# Patient Record
Sex: MALE | URBAN - METROPOLITAN AREA
[De-identification: names, ages, dates, MRNs, and addresses within clinical notes are randomized per-mention and may not be internally consistent; named-entity substitution may affect disease eponyms.]

---

## 2020-09-11 ENCOUNTER — HOSPITAL ENCOUNTER (OUTPATIENT)
Age: 40
Setting detail: SPECIMEN
Discharge: HOME | End: 2020-09-11
Payer: MEDICARE

## 2020-09-18 LAB — SURGICAL PATHOLOGY REPORT: NORMAL

## 2021-07-20 ENCOUNTER — OFFICE VISIT (OUTPATIENT)
Dept: INFECTIOUS DISEASES | Age: 41
End: 2021-07-20
Payer: MEDICARE

## 2021-07-20 ENCOUNTER — HOSPITAL ENCOUNTER (OUTPATIENT)
Age: 41
Discharge: HOME | End: 2021-07-20
Payer: MEDICARE

## 2021-07-20 VITALS
WEIGHT: 152 LBS | BODY MASS INDEX: 22.51 KG/M2 | SYSTOLIC BLOOD PRESSURE: 123 MMHG | HEIGHT: 69 IN | HEART RATE: 74 BPM | DIASTOLIC BLOOD PRESSURE: 75 MMHG | TEMPERATURE: 98.3 F

## 2021-07-20 DIAGNOSIS — R19.7 DIARRHEA OF PRESUMED INFECTIOUS ORIGIN: Primary | ICD-10-CM

## 2021-07-20 DIAGNOSIS — R19.7 DIARRHEA OF PRESUMED INFECTIOUS ORIGIN: ICD-10-CM

## 2021-07-20 PROCEDURE — 99203 OFFICE O/P NEW LOW 30 MIN: CPT | Performed by: INTERNAL MEDICINE

## 2021-07-20 PROCEDURE — 87506 IADNA-DNA/RNA PROBE TQ 6-11: CPT

## 2021-07-20 RX ORDER — CETIRIZINE HYDROCHLORIDE 10 MG/1
TABLET ORAL
COMMUNITY
Start: 2021-04-19

## 2021-07-20 NOTE — PROGRESS NOTES
[facility] - Initial Office Consult Note  Today's Date and Time: 7/20/2021, 4:01 PM    Diagnostic Impression :     1. Diarrhea of presumed infectious origin        Recommendations   · Stool tests for enteric pathogens, C difficile, Giardia, Cryptosporidium  · If no infectious etiology patient will need follow up evaluation with GI for possible endoscopy. Chief complaint/reason for consultation:     Chief Complaint   Patient presents with    Post-COVID Symptoms     diarrhea, dry throat, hair loss, muscle mass loss       History of Present Illness:   Mouna Jenkins is a 41y.o.-year-old male who was initially evaluated on 7/20/2021. Patient seen at the request of Dr. Manuela Nguyen. INITIAL HISTORY:    Patient was previously being treated at [facility] for symptoms which started in March 2020 consisting of diarrhea, muscle weakness, swelling of his tongue, and hair loss. He states that he developed these symptoms a week after sharing an [employer] ride with a passenger who was coughing in the backseat. He states that due to not having all of the COVID-19 symptoms, he was unable to receive a test at that time. He was subsequently seen by GI and received an EGD x2 in September 2020. During the first EGD he states they found a stomach ulcer, and the second EGD was unremarkable. It would seem that H pylori was found because he received several weeks of multiple antibiotics plus antacids and also had breath tests, presumably to check for H pylori. A GI panel on 9/10/2020, showed no bacterial, viral , parasitic enteric pathogens. He had a few additional tests and he was placed on Imodium which he has been taking every day since August 2020. A colonoscopy was not done. Patient also reports swelling of his tongue which started around the same time the other symptoms did. He states he has seen an ENT for this symptom, but was unresolved as of current visit.     He indicates the abdominal pain associated with the ulcer subsided but he has had continuous diarrhea up to the present. He also reports fatigue, but acknowledges a very busy schedule at work, including weekends. Difficulty falling asleep or staying sleep. He is a single father with two children who live with him. The patient was born in [place]. He is the manager of a [employer]. He has traveled to [facility]. CURRENT EVALUATION 7/20/2021:    Physical examination is madalyn. There is no appreciable swelling of the jennifer but he has some fissuring on the edges. Lab work:    Component 09/10/2020 09/10/2020 09/10/2020 09/10/2020          Specimen Source STOOL -- -- --   Campylobacter Not Detected -- -- --   Plesiomonas  Not Detected -- -- --   Salmonella Not Detected -- -- --   Vibrio Not Detected -- -- --   Vibrio Cholerae Not Detected -- -- --   Y Enterocolitica Not Detected -- -- --   Aggregative E Coli Not Detected -- -- --   Pathogenic E Coli Not Detected -- -- --   Toxigenic E Coli Not Detected -- -- --   Shiga Toxin E Coli Not Detected -- -- --   Shigella-E Coli Not Detected -- -- --   Cryptosporidium Not Detected -- -- --   Cyclospora Not Detected -- -- --   E Histolytica Not Detected -- -- --   Giardia Lamblia Not Detected -- -- --   Adenovirus Not Detected -- -- --   Astrovirus Not Detected -- -- --   Norovirus Not Detected -- -- --   Rotavirus A Not Detected -- -- --   Sapovirus Not Detected -- -- --   Pancreat elastase st -- See Below -- --   Toxigenic c diff -- -- Negative --   027 nap1 -- -- Presumptive Negative --   Calprotectin -- -- --        DISCUSSION:  · Patient with chronic diarrhea with prior negative workup for an infectious etiology  · Hx of peptic ulcer, likely with associated H pylori. · Extensive exposure to prior antibiotics. · Will plan to repeat tests for possible infection. · He will most likely need repeat GI evaluation including colonoscopy.     PLAN:    · Stool tests for enteric pathogens, tender. Bowel sounds normal. No organomegaly  All four Extremities: No cyanosis, clubbing, edema, or effusions. Neurologic: No gross sensory or motor deficits. Skin: Warm and dry with good turgor. No signs of peripheral arterial or venous insufficiency. Medical Decision Making:     Note:   · Large amounts of data were reviewed  · Discussed with nursing Staff, Discharge planner  · Infection Control and Prevention measures reviewed  · All prior entries were reviewed  · Administer medications as ordered  · Prognosis: Good  · Discharge planning reviewed  · Follow up as outpatient. Thank you for allowing us to participate in the care of this patient. Please call with questions. Blease Goldberg, DPM     ATTESTATION:    I have discussed the case, including pertinent history and exam findings with the residents and students. I have seen and examined the patient and the key elements of the encounter have been performed by me. I was present when the student obtained his information or examined the patient. I have reviewed the laboratory data, other diagnostic studies and discussed them with the residents. I have updated the medical record where necessary. I agree with the assessment, plan and orders as documented by the resident/ student.     Dario Bautista MD.    Pager: [phone] - Office: [phone]

## 2021-07-21 ENCOUNTER — HOSPITAL ENCOUNTER (OUTPATIENT)
Age: 41
Setting detail: SPECIMEN
Discharge: HOME | End: 2021-07-21
Payer: MEDICARE

## 2021-07-21 DIAGNOSIS — R19.7 DIARRHEA OF PRESUMED INFECTIOUS ORIGIN: ICD-10-CM

## 2021-07-21 PROCEDURE — 87329 GIARDIA AG IA: CPT

## 2021-07-21 PROCEDURE — 87506 IADNA-DNA/RNA PROBE TQ 6-11: CPT

## 2021-07-21 PROCEDURE — 87328 CRYPTOSPORIDIUM AG IA: CPT

## 2021-07-22 LAB
CAMPYLOBACTER PCR: NORMAL
DIRECT EXAM: NORMAL
DIRECT EXAM: NORMAL
E COLI ENTEROTOXIGENIC PCR: NORMAL
Lab: NORMAL
PLESIOMONAS SHIGELLOIDES PCR: NORMAL
SALMONELLA PCR: NORMAL
SHIGATOXIN GENE PCR: NORMAL
SHIGELLA SP PCR: NORMAL
SPECIMEN DESCRIPTION: NORMAL
SPECIMEN DESCRIPTION: NORMAL
VIBRIO PCR: NORMAL
YERSINIA ENTEROCOLITICA PCR: NORMAL

## 2021-08-24 ENCOUNTER — TELEPHONE (OUTPATIENT)
Dept: GASTROENTEROLOGY | Age: 41
End: 2021-08-24

## 2021-08-24 ENCOUNTER — OFFICE VISIT (OUTPATIENT)
Dept: INFECTIOUS DISEASES | Age: 41
End: 2021-08-24
Payer: MEDICARE

## 2021-08-24 VITALS
RESPIRATION RATE: 18 BRPM | HEART RATE: 78 BPM | TEMPERATURE: 98.2 F | BODY MASS INDEX: 24.55 KG/M2 | OXYGEN SATURATION: 97 % | HEIGHT: 67 IN | SYSTOLIC BLOOD PRESSURE: 120 MMHG | WEIGHT: 156.4 LBS | DIASTOLIC BLOOD PRESSURE: 69 MMHG

## 2021-08-24 DIAGNOSIS — R19.7 DIARRHEA OF PRESUMED INFECTIOUS ORIGIN: Primary | ICD-10-CM

## 2021-08-24 PROCEDURE — 99214 OFFICE O/P EST MOD 30 MIN: CPT | Performed by: INTERNAL MEDICINE

## 2021-08-24 NOTE — PATIENT INSTRUCTIONS
Office left voicemail requesting BERNARDINO - Dr Magda Moncada office to call you to schedule an appointment. If they do not call you by Wednesday afternoon, please call them.    Phone number can be found on referral.

## 2021-08-24 NOTE — PROGRESS NOTES
[facility] - Initial Office Consult Note  Today's Date and Time: 8/24/2021, 3:49 PM    Diagnostic Impression :     1. Diarrhea of presumed infectious origin        Recommendations   · Stool tests for enteric pathogens, C difficile, Giardia, Cryptosporidium-All negative  · Patient will need follow up evaluation with GI for possible endoscopy. Chief complaint/reason for consultation:     Chief Complaint   Patient presents with    Diarrhea     been having diarrhea 2 twice a day for over a year        History of Present Illness:   Girish Tripathi is a 41y.o.-year-old male who was initially evaluated on 8/24/2021. Patient seen at the request of Dr. Jian Porter. INITIAL HISTORY:    Patient was previously being treated at [address] [place] [address] for symptoms which started in March 2020 consisting of diarrhea, muscle weakness, swelling of his tongue, and hair loss. He states that he developed these symptoms a week after sharing an [employer] ride with a passenger who was coughing in the backseat. He states that due to not having all of the COVID-19 symptoms, he was unable to receive a test at that time. He was subsequently seen by GI and received an EGD x2 in September 2020. During the first EGD he states they found a stomach ulcer, and the second EGD was unremarkable. It would seem that H pylori was found because he received several weeks of multiple antibiotics plus antacids and also had breath tests, presumably to check for H pylori. A GI panel on 9/10/2020, showed no bacterial, viral , parasitic enteric pathogens. He had a few additional tests and he was placed on Imodium which he has been taking every day since August 2020. A colonoscopy was not done. Patient also reports swelling of his tongue which started around the same time the other symptoms did. He states he has seen an ENT for this symptom, but was unresolved as of current visit.     He indicates the abdominal pain associated with the ulcer 2021 10:14  [facility] De La [facility]ega   Giardia Antigen Assay Negative    Direct Exam 2021 10:14  César De La Vega   Cryptosporidium Antigen Assay Negative          DISCUSSION:  · Patient with chronic diarrhea with prior negative workup for an infectious etiology  · Hx of peptic ulcer, likely with associated H pylori. · Extensive exposure to prior antibiotics. · Repeat tests for possible infection have all been negative  · He was referred for a GI evaluation, including colonoscopy. PLAN:    · As the patient's stool tests have all been negative, and his symptoms continue, he was referred to Dr. Rodrigo Galvan with [facility]    I have personally reviewed the past medical history, past surgical history, medications, social history, and family history, and I have updated the database accordingly. Past Medical History:   History reviewed. No pertinent past medical history. Past Surgical  History:   History reviewed. No pertinent surgical history.     Medications:     Current Outpatient Medications:     cetirizine (ZYRTEC) 10 MG tablet, TAKE 1 TABLET BY MOUTH EVERY DAY (Patient not taking: Reported on 2021), Disp: , Rfl:      Social History:     Social History     Socioeconomic History    Marital status: Unknown     Spouse name: Not on file    Number of children: Not on file    Years of education: Not on file    Highest education level: Not on file   Occupational History    Not on file   Tobacco Use    Smoking status: Former Smoker     Packs/day: 1.00     Types: Cigarettes     Start date:      Quit date:      Years since quittin.6    Smokeless tobacco: Never Used   Vaping Use    Vaping Use: Never used   Substance and Sexual Activity    Alcohol use: Not Currently     Comment: Not currently drinking    Drug use: Never    Sexual activity: Never   Other Topics Concern    Not on file   Social History Narrative    Not on file     Social Determinants of Health     Financial Resource Strain:     Difficulty of Paying Living Expenses:    Food Insecurity:     Worried About [facility] in the Last Year:     [address] in the Last Year:    Transportation Needs:     Lack of Transportation (Medical):  Lack of Transportation (Non-Medical):    Physical Activity:     Days of Exercise per Week:     Minutes of Exercise per Session:    Stress:     Feeling of Stress :    Social Connections:     Frequency of Communication with Friends and Family:     Frequency of Social Gatherings with Friends and Family:     Attends Mandaeism Services:     Active Member of Clubs or Organizations:     Attends Club or Organization Meetings:     Marital Status:    Intimate Partner Violence:     Fear of Current or Ex-Partner:     Emotionally Abused:     Physically Abused:     Sexually Abused:        Family History:   History reviewed. No pertinent family history. Allergies:   Patient has no known allergies. Review of Systems:   Constitutional: No fevers or chills. No systemic complaints  Head: No headaches  Eyes: No double vision or blurry vision. ENT: Reports tinnitus. Cardiovascular: Reports occasional chest pain. No shortness of breath. No TORRES  Lung: No shortness of breath or cough. No sputum production  Abdomen: Reports diarrhea. No nausea, vomiting, or abdominal pain. Genitourinary: No increased urinary frequency, or dysuria. No hematuria. No suprapubic or CVA pain  Musculoskeletal: No muscle aches or pains. No joint effusions, swelling or deformities  Hematologic: No bleeding or bruising. Neurologic: No headache, weakness, numbness, or tingling.     Physical Examination :   /69 (Site: Right Upper Arm, Position: Sitting, Cuff Size: Medium Adult)   Pulse 78   Temp 98.2 °F (36.8 °C) (Temporal)   Resp 18   Ht 5' 7\" (1.702 m)   Wt 156 lb 6.4 oz (70.9 kg)   SpO2 97% Comment: room air at rest  BMI 24.50 kg/m²    General Appearance: Awake, alert, and in no apparent distress  Head:  Normocephalic, no trauma  Eyes: Pupils equal, round, reactive, to light and accommodation; extraocular movements intact; sclera anicteric; conjunctivae pink. No embolic phenomena. ENT: Oropharynx clear, without erythema, exudate, or thrush. No tenderness of sinuses. Mouth/throat: mucosa pink and moist. No lesions. Dentition in good repair. Neck:Supple, without lymphadenopathy. Thyroid normal, No bruits. Pulmonary/Chest: Clear to auscultation, without wheezes, rales, or rhonchi. No dullness to percussion. Cardiovascular: Regular rate and rhythm without murmurs, rubs, or gallops. Abdomen: Soft, non tender. Bowel sounds normal. No organomegaly  All four Extremities: No cyanosis, clubbing, edema, or effusions. Neurologic: No gross sensory or motor deficits. Skin: Warm and dry with good turgor. No signs of peripheral arterial or venous insufficiency. Medical Decision Making:     Note:   · Large amounts of data were reviewed  · Discussed with nursing Staff, Discharge planner  · Infection Control and Prevention measures reviewed  · All prior entries were reviewed  · Administer medications as ordered  · Prognosis: Good  · Discharge planning reviewed  · Follow up as outpatient. Thank you for allowing us to participate in the care of this patient. Please call with questions. WU Butt - CNP     ATTESTATION:    I have discussed the case, including pertinent history and exam findings with the APRN. I have evaluated the  History, physical findings and pictures of the patient and the key elements of the encounter have been performed by me. I have reviewed the laboratory data, other diagnostic studies and discussed them with the APRN. I have updated the medical record where necessary. I agree with the assessment, plan and orders as documented by the APRN.     Valentino Calvin MD.      Pager: [phone] - Office: [phone]

## 2021-10-27 ENCOUNTER — HOSPITAL ENCOUNTER (OUTPATIENT)
Age: 41
Discharge: HOME | End: 2021-10-29
Payer: MEDICARE

## 2021-10-27 ENCOUNTER — HOSPITAL ENCOUNTER (OUTPATIENT)
Dept: GENERAL RADIOLOGY | Age: 41
Discharge: HOME | End: 2021-10-29
Payer: MEDICARE

## 2021-10-27 DIAGNOSIS — R07.9 CHEST PAIN, UNSPECIFIED TYPE: ICD-10-CM

## 2021-10-27 PROCEDURE — 71046 X-RAY EXAM CHEST 2 VIEWS: CPT

## 2021-11-10 ENCOUNTER — OFFICE VISIT (OUTPATIENT)
Dept: GASTROENTEROLOGY | Age: 41
End: 2021-11-10
Payer: MEDICARE

## 2021-11-10 ENCOUNTER — HOSPITAL ENCOUNTER (OUTPATIENT)
Age: 41
Discharge: HOME | End: 2021-11-10
Payer: MEDICARE

## 2021-11-10 VITALS — WEIGHT: 157 LBS | DIASTOLIC BLOOD PRESSURE: 79 MMHG | BODY MASS INDEX: 24.59 KG/M2 | SYSTOLIC BLOOD PRESSURE: 133 MMHG

## 2021-11-10 DIAGNOSIS — R19.5 LOOSE BOWEL MOVEMENT: ICD-10-CM

## 2021-11-10 DIAGNOSIS — R19.5 LOOSE BOWEL MOVEMENT: Primary | ICD-10-CM

## 2021-11-10 LAB — TOTAL CK: 84 U/L (ref 39–308)

## 2021-11-10 PROCEDURE — 82550 ASSAY OF CK (CPK): CPT

## 2021-11-10 PROCEDURE — 85652 RBC SED RATE AUTOMATED: CPT

## 2021-11-10 PROCEDURE — 82746 ASSAY OF FOLIC ACID SERUM: CPT

## 2021-11-10 PROCEDURE — 36415 COLL VENOUS BLD VENIPUNCTURE: CPT

## 2021-11-10 PROCEDURE — G8484 FLU IMMUNIZE NO ADMIN: HCPCS | Performed by: INTERNAL MEDICINE

## 2021-11-10 PROCEDURE — G8420 CALC BMI NORM PARAMETERS: HCPCS | Performed by: INTERNAL MEDICINE

## 2021-11-10 PROCEDURE — 82607 VITAMIN B-12: CPT

## 2021-11-10 PROCEDURE — G8427 DOCREV CUR MEDS BY ELIG CLIN: HCPCS | Performed by: INTERNAL MEDICINE

## 2021-11-10 PROCEDURE — 84630 ASSAY OF ZINC: CPT

## 2021-11-10 PROCEDURE — 1036F TOBACCO NON-USER: CPT | Performed by: INTERNAL MEDICINE

## 2021-11-10 PROCEDURE — 82306 VITAMIN D 25 HYDROXY: CPT

## 2021-11-10 PROCEDURE — 99203 OFFICE O/P NEW LOW 30 MIN: CPT | Performed by: INTERNAL MEDICINE

## 2021-11-10 ASSESSMENT — ENCOUNTER SYMPTOMS
ABDOMINAL PAIN: 1
DIARRHEA: 1
EYES NEGATIVE: 1
ALLERGIC/IMMUNOLOGIC NEGATIVE: 1
RESPIRATORY NEGATIVE: 1

## 2021-11-10 NOTE — PROGRESS NOTES
Reason for Referral:       Gentry Millan MD  [facility],  [address] [place] [address]    Chief Complaint   Patient presents with   [facility] Patient     referred for diarrhea    Diarrhea     Patient states loose bowel movements after he got COVID 1 year ago. States at the begining he was having loose BM daily, now is having them 2-3 times a week. One loose BM a day. STates sometimes having mid abominal pain. HISTORY OF PRESENT ILLNESS: Alxe Ruiz is a 35 y.o. male with a past history remarkable for prior Covid infection, longstanding chronic diarrhea that is not fully explained, referred for evaluation of the symptoms. Patient reports he has been having a chronic diarrhea since his Covid infection, loose watery, approximately 4-6 bowel movements per day. No mucus. No blood per patient. Reports of some mild abdominal cramping. No obvious dietary triggers reported by patient. Subjective weight loss reported by patient. No prior history of similar presentation. No recent endoscopic evaluation. Smoker: quit 10 yrs ago   Drinking history: Drink daily in the past, socially   Abdominal surgeries:  None   Prior Colonoscopy: None   Prior EGD: 1 yr ago, H. Pylori,   FH of GI issues: None       Past Medical,Family, and Social History reviewed and does contribute to the patient presentingcondition. Patient's PMH/PSH,SH,PSYCH Hx, MEDs, ALLERGIES, and ROS were all reviewed and updated in the appropriate sections. PAST MEDICAL HISTORY:  History reviewed. No pertinent past medical history. History reviewed. No pertinent surgical history. CURRENT MEDICATIONS:    Current Outpatient Medications:     cetirizine (ZYRTEC) 10 MG tablet, TAKE 1 TABLET BY MOUTH EVERY DAY, Disp: , Rfl:     ALLERGIES:   No Known Allergies    FAMILY HISTORY: History reviewed. No pertinent family history.       SOCIAL HISTORY:   Social History     Socioeconomic History    Marital status: Unknown     Spouse name: Not on file    Number of children: Not on file    Years of education: Not on file    Highest education level: Not on file   Occupational History    Not on file   Tobacco Use    Smoking status: Former Smoker     Packs/day: 1.00     Types: Cigarettes     Start date: 54     Quit date:      Years since quittin.8    Smokeless tobacco: Never Used   Vaping Use    Vaping Use: Never used   Substance and Sexual Activity    Alcohol use: Not Currently     Comment: Not currently drinking    Drug use: Never    Sexual activity: Never   Other Topics Concern    Not on file   Social History Narrative    Not on file     Social Determinants of Health     Financial Resource Strain:     Difficulty of Paying Living Expenses: Not on file   Food Insecurity:     Worried About [address] in the Last Year: Not on file    Angle of Food in the Last Year: Not on file   Transportation Needs:     Lack of Transportation (Medical): Not on file    Lack of Transportation (Non-Medical):  Not on file   Physical Activity:     Days of Exercise per Week: Not on file    Minutes of Exercise per Session: Not on file   Stress:     Feeling of Stress : Not on file   Social Connections:     Frequency of Communication with Friends and Family: Not on file    Frequency of Social Gatherings with Friends and Family: Not on file    Attends Islam Services: Not on file    Active Member of [address] [place] [address] or Organizations: Not on file    Attends Club or Organization Meetings: Not on file    Marital Status: Not on file   Intimate Partner Violence:     Fear of Current or Ex-Partner: Not on file    Emotionally Abused: Not on file    Physically Abused: Not on file    Sexually Abused: Not on file   Housing Stability:     Unable to Pay for Housing in the Last Year: Not on file    Number of [place] in the Last Year: Not on file    Unstable Housing in the Last Year: Not on file         REVIEW OF SYSTEMS: A 12-point review distension and no mass. There is no tenderness. There is no rebound and no guarding. No hernia. Musculoskeletal: Normal range of motion. Lymphadenopathy:    Patient has no cervical adenopathy. Neurological: Patient is alert and oriented to person, place, and time. Psychiatric: Patient has a normal mood and affect. Patient behavior is normal.       LABORATORY DATA: Reviewed  No results found for: WBC, HGB, HCT, MCV, PLT, NA, K, CL, CO2, BUN, CREATININE, LABPROT, LABALBU, GGT, BILITOT, ALKPHOS, AST, ALT, INR      No results found for: RBC, HGB, MCV, MCH, MCHC, RDW, MPV, BASOPCT, LYMPHSABS, MONOSABS, NEUTROABS, EOSABS, BASOSABS      DIAGNOSTIC TESTING:     XR CHEST (2 VW)    Result Date: 10/27/2021  EXAMINATION: TWO XRAY VIEWS OF THE CHEST 10/27/2021 10:35 am COMPARISON: None. HISTORY: ORDERING SYSTEM PROVIDED HISTORY: Chest pain, unspecified type Reason for Exam: pt states chest pian, cough and sob at times FINDINGS: The lungs are without acute focal process. No effusion or pneumothorax. The cardiomediastinal silhouette is normal.  The osseous structures are intact without acute process. Unremarkable chest.          IMPRESSION:  Lorena Galicia is a 35 y.o. male with a past history remarkable for prior Covid infection, longstanding chronic diarrhea that is not fully explained, referred for evaluation of the symptoms. Patient reports he has been having a chronic diarrhea since his Covid infection, loose watery, approximately 4-6 bowel movements per day. No mucus. No blood per patient. Reports of some mild abdominal cramping. No obvious dietary triggers reported by patient. Subjective weight loss reported by patient. No prior history of similar presentation. No recent endoscopic evaluation. Assessment  1. Loose bowel movement        PLAN:    1) chronic diarrhea-greater than 1 year of symptoms after coinfection-possible lingering postinfectious symptoms. Noninfectious diarrhea symptoms.   Possibly inflammatory in nature. Will send for okay stool studies. Will send for fecal fat, fecal calprotectin, pancreatic elastase. Send for folate, B12, vitamin D, ESR, CK and zinc levels. 2) if initial work-up is negative, will consider diagnostic upper endoscopy and colonoscopy. Patient to take Imodium as needed. Thank you for allowing me to participate in the care of Mr. Alexander Chamberlain. For any further questions please do not hesitate to contact me. I have reviewed and agree with the MA/LPN ROS please refer to their documentation from today's encounter on a separate note. Eduardo Melchor MD, MPH   [facility]  Office #: [phone]          this note is created with the assistance of a speech recognition program.  While intending to generate a document that actually reflects the content of the visit, the document can still have some errors including those of syntax and sound a like substitutions which may escape proof reading. It such instances, actual meaning can be extrapolated by contextual diversion.

## 2021-11-11 LAB
FOLATE: 19.8 NG/ML
SEDIMENTATION RATE, ERYTHROCYTE: 1 MM (ref 0–15)
VITAMIN B-12: 901 PG/ML (ref 232–1245)
VITAMIN D 25-HYDROXY: 32.3 NG/ML (ref 30–100)

## 2021-11-13 ENCOUNTER — HOSPITAL ENCOUNTER (OUTPATIENT)
Age: 41
Setting detail: SPECIMEN
Discharge: HOME | End: 2021-11-13
Payer: MEDICARE

## 2021-11-13 DIAGNOSIS — R19.5 LOOSE BOWEL MOVEMENT: ICD-10-CM

## 2021-11-13 PROCEDURE — 83993 ASSAY FOR CALPROTECTIN FECAL: CPT

## 2021-11-13 PROCEDURE — 83520 IMMUNOASSAY QUANT NOS NONAB: CPT

## 2021-11-13 PROCEDURE — 82705 FATS/LIPIDS FECES QUAL: CPT

## 2021-11-13 PROCEDURE — 82710 FATS/LIPIDS FECES QUANT: CPT

## 2021-11-15 LAB — ZINC: 78.8 UG/DL (ref 60–120)

## 2021-11-17 LAB
FAT QUALITATIVE SPLIT STOOL: NORMAL
FECAL NEUTRAL FAT: NORMAL
FECAL PANCREATIC ELASTASE-1: >800 UG/G

## 2021-11-18 LAB — CALPROTECTIN, FECAL: 7 UG/G

## 2022-01-26 ENCOUNTER — OFFICE VISIT (OUTPATIENT)
Dept: GASTROENTEROLOGY | Age: 42
End: 2022-01-26
Payer: MEDICARE

## 2022-01-26 VITALS — BODY MASS INDEX: 24.9 KG/M2 | WEIGHT: 159 LBS | SYSTOLIC BLOOD PRESSURE: 130 MMHG | DIASTOLIC BLOOD PRESSURE: 65 MMHG

## 2022-01-26 DIAGNOSIS — R19.5 LOOSE BOWEL MOVEMENT: Primary | ICD-10-CM

## 2022-01-26 PROCEDURE — 99213 OFFICE O/P EST LOW 20 MIN: CPT | Performed by: INTERNAL MEDICINE

## 2022-01-26 PROCEDURE — 1036F TOBACCO NON-USER: CPT | Performed by: INTERNAL MEDICINE

## 2022-01-26 PROCEDURE — G8420 CALC BMI NORM PARAMETERS: HCPCS | Performed by: INTERNAL MEDICINE

## 2022-01-26 PROCEDURE — G8427 DOCREV CUR MEDS BY ELIG CLIN: HCPCS | Performed by: INTERNAL MEDICINE

## 2022-01-26 PROCEDURE — G8484 FLU IMMUNIZE NO ADMIN: HCPCS | Performed by: INTERNAL MEDICINE

## 2022-01-26 RX ORDER — LOPERAMIDE HYDROCHLORIDE 2 MG/1
2 CAPSULE ORAL 4 TIMES DAILY PRN
Qty: 40 CAPSULE | Refills: 1 | Status: SHIPPED | OUTPATIENT
Start: 2022-01-26 | End: 2022-01-26 | Stop reason: SDUPTHER

## 2022-01-26 RX ORDER — OMEPRAZOLE 20 MG/1
20 CAPSULE, DELAYED RELEASE ORAL DAILY
Qty: 30 CAPSULE | Refills: 3 | Status: SHIPPED | OUTPATIENT
Start: 2022-01-26 | End: 2022-01-26 | Stop reason: SDUPTHER

## 2022-01-26 RX ORDER — LOPERAMIDE HYDROCHLORIDE 2 MG/1
2 CAPSULE ORAL 4 TIMES DAILY PRN
Qty: 40 CAPSULE | Refills: 1 | Status: SHIPPED | OUTPATIENT
Start: 2022-01-26 | End: 2022-02-05

## 2022-01-26 RX ORDER — OMEPRAZOLE 20 MG/1
20 CAPSULE, DELAYED RELEASE ORAL DAILY
Qty: 30 CAPSULE | Refills: 3 | Status: SHIPPED | OUTPATIENT
Start: 2022-01-26

## 2022-01-26 ASSESSMENT — ENCOUNTER SYMPTOMS
ABDOMINAL PAIN: 1
EYES NEGATIVE: 1
DIARRHEA: 1
RESPIRATORY NEGATIVE: 1
ALLERGIC/IMMUNOLOGIC NEGATIVE: 1

## 2022-01-26 NOTE — PROGRESS NOTES
GI FOLLOW UP    INTERVAL HISTORY:     Mildly improved loose bowel movements  Serological test and stool test appear to be negative      Chief Complaint   Patient presents with    Follow-up     lab follow up     Diarrhea     Patient states having loose bowel movements 1-2 times a week. Denies bleeding. Has occasional abdominal pain after eating. 1. Loose bowel movement          HISTORY OF PRESENT ILLNESS: Cecelia Salazar is a 35 y.o. male with a past history remarkable for prior Covid infection, longstanding chronic diarrhea that is not fully explained, referred for evaluation of the symptoms. Patient reports he has been having a chronic diarrhea since his Covid infection, loose watery, approximately 4-6 bowel movements per day. No mucus. No blood per patient. Reports of some mild abdominal cramping. No obvious dietary triggers reported by patient. Subjective weight loss reported by patient. No prior history of similar presentation. No recent endoscopic evaluation.        Smoker: quit 10 yrs ago   Drinking history: Drink daily in the past, socially   Abdominal surgeries:  None   Prior Colonoscopy: None   Prior EGD: 1 yr ago, H. Pylori,   FH of GI issues: None     Past Medical,Family, and Social History reviewed and does contribute to the patient presenting condition. Patient's PMH/PSH,SH,PSYCH Hx, MEDs, ALLERGIES, and ROS were all reviewed and updated in the appropriate sections. PAST MEDICAL HISTORY:  No past medical history on file. No past surgical history on file. CURRENT MEDICATIONS:    Current Outpatient Medications:     cetirizine (ZYRTEC) 10 MG tablet, TAKE 1 TABLET BY MOUTH EVERY DAY, Disp: , Rfl:     ALLERGIES:   No Known Allergies    FAMILY HISTORY: No family history on file.       SOCIAL HISTORY:   Social History     Socioeconomic History    Marital status: Unknown Spouse name: Not on file    Number of children: Not on file    Years of education: Not on file    Highest education level: Not on file   Occupational History    Not on file   Tobacco Use    Smoking status: Former Smoker     Packs/day: 1.00     Types: Cigarettes     Start date: 54     Quit date:      Years since quittin.0    Smokeless tobacco: Never Used   Vaping Use    Vaping Use: Never used   Substance and Sexual Activity    Alcohol use: Not Currently     Comment: Not currently drinking    Drug use: Never    Sexual activity: Never   Other Topics Concern    Not on file   Social History Narrative    Not on file     Social Determinants of Health     Financial Resource Strain:     Difficulty of Paying Living Expenses: Not on file   Food Insecurity:     Worried About [employer] in the Last Year: Not on file    Angle of Food in the Last Year: Not on file   Transportation Needs:     Lack of Transportation (Medical): Not on file    Lack of Transportation (Non-Medical):  Not on file   Physical Activity:     Days of Exercise per Week: Not on file    Minutes of Exercise per Session: Not on file   Stress:     Feeling of Stress : Not on file   Social Connections:     Frequency of Communication with Friends and Family: Not on file    Frequency of Social Gatherings with Friends and Family: Not on file    Attends Restorationist Services: Not on file    Active Member of [address] [place] [address] or Organizations: Not on file    Attends Club or Organization Meetings: Not on file    Marital Status: Not on file   Intimate Partner Violence:     Fear of Current or Ex-Partner: Not on file    Emotionally Abused: Not on file    Physically Abused: Not on file    Sexually Abused: Not on file   Housing Stability:     Unable to Pay for Housing in the Last Year: Not on file    Number of [place] in the Last Year: Not on file    Unstable Housing in the Last Year: Not on file       REVIEW OF SYSTEMS: A 12-point review of systems was obtained and pertinent positives and negatives were listed below. REVIEW OF SYSTEMS:     Constitutional: No fever, no chills, no lethargy, no weakness. HEENT:  No headache, otalgia, itchy eyes, nasal discharge or sore throat. Cardiac:  No chest pain, dyspnea, orthopnea or PND. Chest:   No cough, phlegm or wheezing. Abdomen:      Detailed by MA   Neuro:  No focal weakness, abnormal movements or seizure like activity. Skin:   No rashes, no itching. :   No hematuria, no pyuria, no dysuria, no flank pain. Extremities:  No swelling or joint pains. ROS was otherwise negative    Review of Systems   Constitutional: Negative. HENT: Negative. Eyes: Negative. Respiratory: Negative. Cardiovascular: Negative. Gastrointestinal: Positive for abdominal pain and diarrhea. Endocrine: Negative. Genitourinary: Negative. Musculoskeletal: Negative. Skin: Negative. Allergic/Immunologic: Negative. Neurological: Negative. Hematological: Negative. Psychiatric/Behavioral: Negative. All other systems reviewed and are negative. PHYSICAL EXAMINATION: Vital signs reviewed per the nursing documentation. /65   Wt 159 lb (72.1 kg)   BMI 24.90 kg/m²   Body mass index is 24.9 kg/m². Physical Exam    Physical Exam   Constitutional: Patient is oriented to person, place, and time. Patient appears well-developed and well-nourished. HENT:   Head: Normocephalic and atraumatic. Eyes: Pupils are equal, round, and reactive to light. EOM are normal.   Neck: Normal range of motion. Neck supple. No JVD present. No tracheal deviation present. No thyromegaly present. Cardiovascular: Normal rate, regular rhythm, normal heart sounds and intact distal pulses. Pulmonary/Chest: Effort normal and breath sounds normal. No stridor. No respiratory distress. He has no wheezes. He has no rales. He exhibits no tenderness. Abdominal: Soft.  Bowel sounds are normal. He exhibits no distension and no mass. There is no tenderness. There is no rebound and no guarding. No hernia. Musculoskeletal: Normal range of motion. Lymphadenopathy:    Patient has no cervical adenopathy. Neurological: Patient is alert and oriented to person, place, and time. Psychiatric: Patient has a normal mood and affect. Patient behavior is normal.       LABORATORY DATA: Reviewed  No results found for: WBC, HGB, HCT, MCV, PLT, NA, K, CL, CO2, BUN, CREATININE, LABPROT, LABALBU, GGT, BILITOT, ALKPHOS, AST, ALT, INR      No results found for: RBC, HGB, MCV, MCH, MCHC, RDW, MPV, BASOPCT, LYMPHSABS, MONOSABS, NEUTROABS, EOSABS, BASOSABS      DIAGNOSTIC TESTING:     No results found. IMPRESSION: Sabi Lowery is a 35 y.o. male with a past history remarkable for prior Covid infection, longstanding chronic diarrhea that is not fully explained, referred for evaluation of the symptoms. Patient reports he has been having a chronic diarrhea since his Covid infection, loose watery, approximately 4-6 bowel movements per day. No mucus. No blood per patient. Reports of some mild abdominal cramping. No obvious dietary triggers reported by patient. Subjective weight loss reported by patient. No prior history of similar presentation. No recent endoscopic evaluation. Assessment  1. Loose bowel movement        PLAN:    1) chronic diarrhea-possible food sensitivity, will send for food comprehensive panel. Imodium as needed. Patient deferring endoscopic evaluation. Stool and serological test have been negative thus far. 2) Prilosec to be tried for mild dyspepsia symptoms. 3) RTC in 3 months. Endoscopy deferred by patient. Will review and reevaluate on next visit. Thank you for allowing me to participate in the care of Mr. Richar Enriquez. For any further questions please do not hesitate to contact me.     I have reviewed and agree with the ROS entered by the MA/LPN from today's encounter documented in a separate note. Uma Del Castillo MD, MPH   [facility]  Office #: [phone]    this note is created with the assistance of a speech recognition program.  While intending to generate a document that actually reflects the content of the visit, the document can still have some errors including those of syntax and sound a like substitutions which may escape proof reading. It such instances, actual meaning can be extrapolated by contextual diversion.

## 2022-02-07 ENCOUNTER — TELEPHONE (OUTPATIENT)
Dept: GASTROENTEROLOGY | Age: 42
End: 2022-02-07

## 2022-02-07 NOTE — TELEPHONE ENCOUNTER
Patient called in stating that he has had no relief with the omeprazole for last week, he is taking 20 mg once daily. His next appointment is 4/26/22. He was wondering if there was something else that can be sent to pharmacy or if med dosage can be adjusted. Please advise thank you!

## 2022-04-26 DIAGNOSIS — R19.7 DIARRHEA, UNSPECIFIED TYPE: Primary | ICD-10-CM
